# Patient Record
Sex: MALE | Race: WHITE | Employment: OTHER | URBAN - NONMETROPOLITAN AREA
[De-identification: names, ages, dates, MRNs, and addresses within clinical notes are randomized per-mention and may not be internally consistent; named-entity substitution may affect disease eponyms.]

---

## 2017-08-14 ENCOUNTER — OFFICE VISIT (OUTPATIENT)
Dept: FAMILY MEDICINE CLINIC | Age: 42
End: 2017-08-14
Payer: MEDICAID

## 2017-08-14 VITALS
SYSTOLIC BLOOD PRESSURE: 150 MMHG | HEART RATE: 93 BPM | OXYGEN SATURATION: 97 % | WEIGHT: 281 LBS | DIASTOLIC BLOOD PRESSURE: 102 MMHG | HEIGHT: 74 IN | BODY MASS INDEX: 36.06 KG/M2

## 2017-08-14 DIAGNOSIS — R53.83 FATIGUE, UNSPECIFIED TYPE: ICD-10-CM

## 2017-08-14 DIAGNOSIS — Z72.0 TOBACCO ABUSE: ICD-10-CM

## 2017-08-14 DIAGNOSIS — E78.5 HYPERLIPIDEMIA, UNSPECIFIED HYPERLIPIDEMIA TYPE: ICD-10-CM

## 2017-08-14 DIAGNOSIS — H54.40 BLINDNESS OF RIGHT EYE: ICD-10-CM

## 2017-08-14 DIAGNOSIS — I10 ESSENTIAL HYPERTENSION: ICD-10-CM

## 2017-08-14 DIAGNOSIS — E11.9 TYPE 2 DIABETES MELLITUS WITHOUT COMPLICATION, WITHOUT LONG-TERM CURRENT USE OF INSULIN (HCC): ICD-10-CM

## 2017-08-14 DIAGNOSIS — F40.01 AGORAPHOBIA WITH PANIC ATTACKS: ICD-10-CM

## 2017-08-14 DIAGNOSIS — Z00.00 ROUTINE HEALTH MAINTENANCE: Primary | ICD-10-CM

## 2017-08-14 PROCEDURE — 99205 OFFICE O/P NEW HI 60 MIN: CPT | Performed by: NURSE PRACTITIONER

## 2017-08-14 RX ORDER — DULOXETIN HYDROCHLORIDE 60 MG/1
60 CAPSULE, DELAYED RELEASE ORAL DAILY
COMMUNITY
End: 2017-08-14 | Stop reason: SDUPTHER

## 2017-08-14 RX ORDER — ATENOLOL 25 MG/1
25 TABLET ORAL DAILY
COMMUNITY
End: 2017-08-14 | Stop reason: SDUPTHER

## 2017-08-14 RX ORDER — PRAVASTATIN SODIUM 40 MG
40 TABLET ORAL DAILY
Qty: 30 TABLET | Refills: 5 | Status: SHIPPED | OUTPATIENT
Start: 2017-08-14 | End: 2017-10-12 | Stop reason: ALTCHOICE

## 2017-08-14 RX ORDER — DULOXETIN HYDROCHLORIDE 60 MG/1
60 CAPSULE, DELAYED RELEASE ORAL DAILY
Qty: 30 CAPSULE | Refills: 5 | Status: SHIPPED | OUTPATIENT
Start: 2017-08-14 | End: 2018-03-05 | Stop reason: SDUPTHER

## 2017-08-14 RX ORDER — CLONAZEPAM 1 MG/1
1 TABLET ORAL 3 TIMES DAILY PRN
COMMUNITY

## 2017-08-14 RX ORDER — ATENOLOL 25 MG/1
25 TABLET ORAL DAILY
Qty: 30 TABLET | Refills: 5 | Status: SHIPPED | OUTPATIENT
Start: 2017-08-14 | End: 2018-03-02 | Stop reason: SDUPTHER

## 2017-08-14 RX ORDER — PRAVASTATIN SODIUM 40 MG
40 TABLET ORAL DAILY
COMMUNITY
End: 2017-08-14 | Stop reason: SDUPTHER

## 2017-08-14 ASSESSMENT — ENCOUNTER SYMPTOMS
SHORTNESS OF BREATH: 0
VOMITING: 0
DIARRHEA: 0
GASTROINTESTINAL NEGATIVE: 1
ABDOMINAL PAIN: 0
CHEST TIGHTNESS: 0
ALLERGIC/IMMUNOLOGIC NEGATIVE: 1
TROUBLE SWALLOWING: 0
EYES NEGATIVE: 1
RESPIRATORY NEGATIVE: 1
SINUS PRESSURE: 0
CONSTIPATION: 0
NAUSEA: 0
COUGH: 0

## 2017-08-14 ASSESSMENT — PATIENT HEALTH QUESTIONNAIRE - PHQ9
2. FEELING DOWN, DEPRESSED OR HOPELESS: 1
1. LITTLE INTEREST OR PLEASURE IN DOING THINGS: 1
SUM OF ALL RESPONSES TO PHQ QUESTIONS 1-9: 2
SUM OF ALL RESPONSES TO PHQ9 QUESTIONS 1 & 2: 2

## 2017-09-20 ENCOUNTER — OFFICE VISIT (OUTPATIENT)
Dept: OPTOMETRY | Age: 42
End: 2017-09-20
Payer: COMMERCIAL

## 2017-09-20 DIAGNOSIS — E11.9 NON-INSULIN TREATED TYPE 2 DIABETES MELLITUS (HCC): Primary | ICD-10-CM

## 2017-09-20 DIAGNOSIS — H52.4 BILATERAL PRESBYOPIA: ICD-10-CM

## 2017-09-20 DIAGNOSIS — H53.001 AMBLYOPIA OF EYE, RIGHT: ICD-10-CM

## 2017-09-20 PROCEDURE — 92004 COMPRE OPH EXAM NEW PT 1/>: CPT | Performed by: OPTOMETRIST

## 2017-09-20 RX ORDER — PHENYLEPHRINE HCL 2.5 %
1 DROPS OPHTHALMIC (EYE) ONCE
Status: COMPLETED | OUTPATIENT
Start: 2017-09-20 | End: 2017-09-20

## 2017-09-20 RX ORDER — BENOXINATE HCL/FLUORESCEIN SOD 0.4%-0.25%
1 DROPS OPHTHALMIC (EYE) ONCE
Status: COMPLETED | OUTPATIENT
Start: 2017-09-20 | End: 2017-09-20

## 2017-09-20 RX ORDER — TROPICAMIDE 10 MG/ML
1 SOLUTION/ DROPS OPHTHALMIC ONCE
Status: COMPLETED | OUTPATIENT
Start: 2017-09-20 | End: 2017-09-20

## 2017-09-20 RX ADMIN — Medication 1 DROP: at 09:09

## 2017-09-20 RX ADMIN — TROPICAMIDE 1 DROP: 10 SOLUTION/ DROPS OPHTHALMIC at 09:09

## 2017-09-20 ASSESSMENT — REFRACTION_MANIFEST
OS_AXIS: 080
OS_SPHERE: PLANO
OS_CYLINDER: -0.75
OD_ADD: +1.00
OS_ADD: +1.00
OD_AXIS: 075
OD_CYLINDER: -0.50
OD_SPHERE: +1.75

## 2017-09-20 ASSESSMENT — VISUAL ACUITY
OD_SC: 20/30 OU
METHOD: SNELLEN - LINEAR
OS_SC+: +2
OD_SC: 20/200
OS_SC: 20/30

## 2017-09-20 ASSESSMENT — KERATOMETRY
OS_AXISANGLE_DEGREES: 089
OD_K2POWER_DIOPTERS: 42.50
OS_K2POWER_DIOPTERS: 42.50
OS_AXISANGLE2_DEGREES: 179
OD_K1POWER_DIOPTERS: 42.25
OD_AXISANGLE2_DEGREES: 044
OS_K1POWER_DIOPTERS: 42.25
OD_AXISANGLE_DEGREES: 134

## 2017-09-20 ASSESSMENT — TONOMETRY
OS_IOP_MMHG: 19
OD_IOP_MMHG: 20
IOP_METHOD: APPLANATION W FLURESS DROP

## 2017-09-20 ASSESSMENT — SLIT LAMP EXAM - LIDS
COMMENTS: NORMAL
COMMENTS: NORMAL

## 2017-10-12 DIAGNOSIS — E78.5 HYPERLIPIDEMIA, UNSPECIFIED HYPERLIPIDEMIA TYPE: Primary | ICD-10-CM

## 2017-10-12 RX ORDER — ATORVASTATIN CALCIUM 10 MG/1
10 TABLET, FILM COATED ORAL DAILY
Qty: 30 TABLET | Refills: 5 | OUTPATIENT
Start: 2017-10-12

## 2017-10-12 NOTE — TELEPHONE ENCOUNTER
Insurance denied Pravachol 40mg, but will approve Atorvastatin 10 or 20mg.  10mg pended for [facility]

## 2017-11-30 ENCOUNTER — TELEPHONE (OUTPATIENT)
Dept: FAMILY MEDICINE CLINIC | Age: 42
End: 2017-11-30

## 2018-02-12 DIAGNOSIS — F40.01 AGORAPHOBIA WITH PANIC ATTACKS: ICD-10-CM

## 2018-02-12 DIAGNOSIS — I10 ESSENTIAL HYPERTENSION: ICD-10-CM

## 2018-02-12 DIAGNOSIS — E11.9 TYPE 2 DIABETES MELLITUS WITHOUT COMPLICATION, WITHOUT LONG-TERM CURRENT USE OF INSULIN (HCC): ICD-10-CM

## 2018-02-12 RX ORDER — ATENOLOL 25 MG/1
TABLET ORAL
Qty: 30 TABLET | Refills: 5 | OUTPATIENT
Start: 2018-02-12

## 2018-02-12 RX ORDER — DULOXETIN HYDROCHLORIDE 60 MG/1
CAPSULE, DELAYED RELEASE ORAL
Qty: 30 CAPSULE | Refills: 5 | OUTPATIENT
Start: 2018-02-12

## 2018-03-01 ENCOUNTER — HOSPITAL ENCOUNTER (OUTPATIENT)
Dept: LAB | Age: 43
Setting detail: SPECIMEN
Discharge: HOME | End: 2018-03-01
Payer: MEDICAID

## 2018-03-01 ENCOUNTER — OFFICE VISIT (OUTPATIENT)
Dept: FAMILY MEDICINE CLINIC | Age: 43
End: 2018-03-01
Payer: MEDICAID

## 2018-03-01 VITALS
DIASTOLIC BLOOD PRESSURE: 82 MMHG | BODY MASS INDEX: 36.11 KG/M2 | HEART RATE: 84 BPM | SYSTOLIC BLOOD PRESSURE: 134 MMHG | RESPIRATION RATE: 12 BRPM | WEIGHT: 281.4 LBS | HEIGHT: 74 IN | OXYGEN SATURATION: 94 % | TEMPERATURE: 96.8 F

## 2018-03-01 DIAGNOSIS — Z72.0 TOBACCO ABUSE: ICD-10-CM

## 2018-03-01 DIAGNOSIS — E11.9 TYPE 2 DIABETES MELLITUS WITHOUT COMPLICATION, WITHOUT LONG-TERM CURRENT USE OF INSULIN (HCC): Primary | ICD-10-CM

## 2018-03-01 DIAGNOSIS — E11.9 TYPE 2 DIABETES MELLITUS WITHOUT COMPLICATION, WITHOUT LONG-TERM CURRENT USE OF INSULIN (HCC): ICD-10-CM

## 2018-03-01 DIAGNOSIS — F40.01 AGORAPHOBIA WITH PANIC ATTACKS: ICD-10-CM

## 2018-03-01 DIAGNOSIS — I10 ESSENTIAL HYPERTENSION: ICD-10-CM

## 2018-03-01 DIAGNOSIS — J30.9 CHRONIC ALLERGIC RHINITIS, UNSPECIFIED SEASONALITY, UNSPECIFIED TRIGGER: ICD-10-CM

## 2018-03-01 DIAGNOSIS — Z09 FOLLOW-UP EXAM, 3-6 MONTHS SINCE PREVIOUS EXAM: ICD-10-CM

## 2018-03-01 DIAGNOSIS — Z00.00 ROUTINE HEALTH MAINTENANCE: ICD-10-CM

## 2018-03-01 DIAGNOSIS — R53.83 FATIGUE, UNSPECIFIED TYPE: ICD-10-CM

## 2018-03-01 DIAGNOSIS — E78.5 HYPERLIPIDEMIA, UNSPECIFIED HYPERLIPIDEMIA TYPE: ICD-10-CM

## 2018-03-01 LAB
ABSOLUTE EOS #: 0.2 K/UL (ref 0–0.4)
ABSOLUTE IMMATURE GRANULOCYTE: NORMAL K/UL (ref 0–0.3)
ABSOLUTE LYMPH #: 2 K/UL (ref 1–4.8)
ABSOLUTE MONO #: 0.4 K/UL (ref 0.1–1.2)
ALBUMIN SERPL-MCNC: 4.4 G/DL (ref 3.5–5.2)
ALBUMIN/GLOBULIN RATIO: 1.6 (ref 1–2.5)
ALP BLD-CCNC: 83 U/L (ref 40–129)
ALT SERPL-CCNC: 53 U/L (ref 5–41)
ANION GAP SERPL CALCULATED.3IONS-SCNC: 13 MMOL/L (ref 9–17)
AST SERPL-CCNC: 29 U/L
BASOPHILS # BLD: 1 % (ref 0–2)
BASOPHILS ABSOLUTE: 0.1 K/UL (ref 0–0.2)
BILIRUB SERPL-MCNC: 0.4 MG/DL (ref 0.3–1.2)
BUN BLDV-MCNC: 10 MG/DL (ref 6–20)
BUN/CREAT BLD: 16 (ref 9–20)
CALCIUM SERPL-MCNC: 9.4 MG/DL (ref 8.6–10.4)
CHLORIDE BLD-SCNC: 100 MMOL/L (ref 98–107)
CHOLESTEROL/HDL RATIO: 7.8
CHOLESTEROL: 211 MG/DL
CO2: 25 MMOL/L (ref 20–31)
CREAT SERPL-MCNC: 0.63 MG/DL (ref 0.7–1.2)
CREATININE URINE: 203.1 MG/DL (ref 39–259)
DIFFERENTIAL TYPE: NORMAL
EOSINOPHILS RELATIVE PERCENT: 3 % (ref 1–8)
ESTIMATED AVERAGE GLUCOSE: 217 MG/DL
GFR AFRICAN AMERICAN: >60 ML/MIN
GFR NON-AFRICAN AMERICAN: >60 ML/MIN
GFR SERPL CREATININE-BSD FRML MDRD: ABNORMAL ML/MIN/{1.73_M2}
GFR SERPL CREATININE-BSD FRML MDRD: ABNORMAL ML/MIN/{1.73_M2}
GLUCOSE BLD-MCNC: 223 MG/DL (ref 70–99)
HBA1C MFR BLD: 9.2 % (ref 4.8–5.9)
HCT VFR BLD CALC: 49 % (ref 41–53)
HDLC SERPL-MCNC: 27 MG/DL
HEMOGLOBIN: 16.6 G/DL (ref 13.5–17.5)
HIV AG/AB: NONREACTIVE
IMMATURE GRANULOCYTES: NORMAL %
LDL CHOLESTEROL: 148 MG/DL (ref 0–130)
LYMPHOCYTES # BLD: 30 % (ref 15–43)
MCH RBC QN AUTO: 30.2 PG (ref 26–34)
MCHC RBC AUTO-ENTMCNC: 33.9 G/DL (ref 31–37)
MCV RBC AUTO: 88.9 FL (ref 80–100)
MICROALBUMIN/CREAT 24H UR: 85 MG/L
MICROALBUMIN/CREAT UR-RTO: 42 MCG/MG CREAT
MONOCYTES # BLD: 6 % (ref 6–14)
NRBC AUTOMATED: NORMAL PER 100 WBC
PDW BLD-RTO: 13.1 % (ref 11–14.5)
PLATELET # BLD: 233 K/UL (ref 140–450)
PLATELET ESTIMATE: NORMAL
PMV BLD AUTO: 7.9 FL (ref 6–12)
POTASSIUM SERPL-SCNC: 4.2 MMOL/L (ref 3.7–5.3)
RBC # BLD: 5.51 M/UL (ref 4.5–5.9)
RBC # BLD: NORMAL 10*6/UL
SEG NEUTROPHILS: 60 % (ref 44–74)
SEGMENTED NEUTROPHILS ABSOLUTE COUNT: 3.9 K/UL (ref 1.8–7.7)
SODIUM BLD-SCNC: 138 MMOL/L (ref 135–144)
THYROXINE, FREE: 0.87 NG/DL (ref 0.93–1.7)
TOTAL PROTEIN: 7.2 G/DL (ref 6.4–8.3)
TRIGL SERPL-MCNC: 181 MG/DL
TSH SERPL DL<=0.05 MIU/L-ACNC: 3.86 MIU/L (ref 0.3–5)
VLDLC SERPL CALC-MCNC: ABNORMAL MG/DL (ref 1–30)
WBC # BLD: 6.7 K/UL (ref 3.5–11)
WBC # BLD: NORMAL 10*3/UL

## 2018-03-01 PROCEDURE — 80061 LIPID PANEL: CPT

## 2018-03-01 PROCEDURE — 82306 VITAMIN D 25 HYDROXY: CPT

## 2018-03-01 PROCEDURE — G8427 DOCREV CUR MEDS BY ELIG CLIN: HCPCS | Performed by: NURSE PRACTITIONER

## 2018-03-01 PROCEDURE — 99214 OFFICE O/P EST MOD 30 MIN: CPT | Performed by: NURSE PRACTITIONER

## 2018-03-01 PROCEDURE — 83036 HEMOGLOBIN GLYCOSYLATED A1C: CPT

## 2018-03-01 PROCEDURE — 3046F HEMOGLOBIN A1C LEVEL >9.0%: CPT | Performed by: NURSE PRACTITIONER

## 2018-03-01 PROCEDURE — 4004F PT TOBACCO SCREEN RCVD TLK: CPT | Performed by: NURSE PRACTITIONER

## 2018-03-01 PROCEDURE — 84443 ASSAY THYROID STIM HORMONE: CPT

## 2018-03-01 PROCEDURE — 36415 COLL VENOUS BLD VENIPUNCTURE: CPT

## 2018-03-01 PROCEDURE — 87389 HIV-1 AG W/HIV-1&-2 AB AG IA: CPT

## 2018-03-01 PROCEDURE — G8417 CALC BMI ABV UP PARAM F/U: HCPCS | Performed by: NURSE PRACTITIONER

## 2018-03-01 PROCEDURE — 85025 COMPLETE CBC W/AUTO DIFF WBC: CPT

## 2018-03-01 PROCEDURE — 80053 COMPREHEN METABOLIC PANEL: CPT

## 2018-03-01 PROCEDURE — 82043 UR ALBUMIN QUANTITATIVE: CPT

## 2018-03-01 PROCEDURE — 84439 ASSAY OF FREE THYROXINE: CPT

## 2018-03-01 PROCEDURE — G8484 FLU IMMUNIZE NO ADMIN: HCPCS | Performed by: NURSE PRACTITIONER

## 2018-03-01 PROCEDURE — 82570 ASSAY OF URINE CREATININE: CPT

## 2018-03-01 RX ORDER — PRAVASTATIN SODIUM 40 MG
40 TABLET ORAL DAILY
Qty: 30 TABLET | Refills: 5 | Status: SHIPPED | OUTPATIENT
Start: 2018-03-01 | End: 2018-03-31

## 2018-03-01 RX ORDER — NICOTINE 21 MG/24HR
1 PATCH, TRANSDERMAL 24 HOURS TRANSDERMAL EVERY 24 HOURS
Qty: 30 PATCH | Refills: 3 | Status: SHIPPED | OUTPATIENT
Start: 2018-03-01 | End: 2019-03-01

## 2018-03-01 RX ORDER — FLUTICASONE PROPIONATE 50 MCG
2 SPRAY, SUSPENSION (ML) NASAL DAILY
Qty: 1 BOTTLE | Refills: 11 | Status: SHIPPED | OUTPATIENT
Start: 2018-03-01

## 2018-03-01 ASSESSMENT — ENCOUNTER SYMPTOMS
DIARRHEA: 0
SINUS PRESSURE: 0
VOMITING: 0
GASTROINTESTINAL NEGATIVE: 1
CONSTIPATION: 0
CHEST TIGHTNESS: 0
TROUBLE SWALLOWING: 0
COUGH: 0
ABDOMINAL PAIN: 0
ALLERGIC/IMMUNOLOGIC NEGATIVE: 1
SHORTNESS OF BREATH: 0
NAUSEA: 0
EYES NEGATIVE: 1

## 2018-03-01 NOTE — PROGRESS NOTES
Tobacco Use Visit Information:    Have you changed or started any medications since your last visit including any over-the-counter medicines, vitamins, or herbal medicines? no   Are you having any side effects from any of your medications? -  no  Have you stopped taking any of your medications? Is so, why? -  no    Have you seen any other physician or provider since your last visit? No  Have you had any other diagnostic tests since your last visit? No  Have you been seen in the emergency room and/or had an admission to a hospital since we last saw you? No  Have you had your routine dental cleaning in the past 6 months? no    Have you activated your [employer] account? If not, what are your barriers?  No: not interested     Patient Care Team:  Chiqui Alatorre NP as PCP - General (Family Medicine)    Current Tobacco Use    History   Smoking Status    Current Every Day Smoker    Packs/day: 1.50    Types: Cigarettes   Smokeless Tobacco    Never Used     Ready to quit: Yes  Counseling given: Yes     Are you motivated to quit? - yes  If yes, have you set a date to quite? - no    Have you tried any anti-smoking aids in the past? -  no     1-800-QUIT-NOW (1-838.298.3785)     Medical History Review  Past Medical, Family, and Social History reviewed and does contribute to the patient presenting condition    Health Maintenance   Topic Date Due    Potassium monitoring  1973    Creatinine monitoring  1973    Diabetic foot exam  03/07/1983    A1C test (Diabetic or Prediabetic)  03/07/1983    Lipid screen  03/07/1983    HIV screen  03/07/1988    Diabetic microalbuminuria test  03/07/1991    DTaP/Tdap/Td vaccine (1 - Tdap) 03/07/1992    Pneumococcal med risk (1 of 1 - PPSV23) 03/07/1992    Flu vaccine (1) 09/01/2017    Diabetic retinal exam  09/20/2018

## 2018-03-02 DIAGNOSIS — I10 ESSENTIAL HYPERTENSION: ICD-10-CM

## 2018-03-02 DIAGNOSIS — E55.9 VITAMIN D DEFICIENCY: Primary | ICD-10-CM

## 2018-03-02 LAB — VITAMIN D 25-HYDROXY: 12.5 NG/ML (ref 30–100)

## 2018-03-02 RX ORDER — ERGOCALCIFEROL 1.25 MG/1
50000 CAPSULE ORAL WEEKLY
Qty: 12 CAPSULE | Refills: 0 | Status: SHIPPED | OUTPATIENT
Start: 2018-03-02 | End: 2018-05-19

## 2018-03-05 DIAGNOSIS — I10 ESSENTIAL HYPERTENSION: ICD-10-CM

## 2018-03-05 DIAGNOSIS — F40.01 AGORAPHOBIA WITH PANIC ATTACKS: ICD-10-CM

## 2018-03-05 RX ORDER — DULOXETIN HYDROCHLORIDE 60 MG/1
60 CAPSULE, DELAYED RELEASE ORAL DAILY
Qty: 30 CAPSULE | Refills: 5 | Status: SHIPPED | OUTPATIENT
Start: 2018-03-05

## 2018-03-05 RX ORDER — ATENOLOL 25 MG/1
TABLET ORAL
Qty: 30 TABLET | Refills: 5 | Status: SHIPPED | OUTPATIENT
Start: 2018-03-05

## 2018-03-05 RX ORDER — ATENOLOL 25 MG/1
TABLET ORAL
Qty: 30 TABLET | Refills: 5 | OUTPATIENT
Start: 2018-03-05

## 2018-06-07 ENCOUNTER — TELEPHONE (OUTPATIENT)
Dept: FAMILY MEDICINE CLINIC | Age: 43
End: 2018-06-07

## 2018-06-11 ENCOUNTER — TELEPHONE (OUTPATIENT)
Dept: PRIMARY CARE CLINIC | Age: 43
End: 2018-06-11

## 2018-07-12 ENCOUNTER — TELEPHONE (OUTPATIENT)
Dept: PRIMARY CARE CLINIC | Age: 43
End: 2018-07-12

## 2018-07-12 NOTE — LETTER
[address] [facility]  [facility] [address]  Phone: [phone]  Fax: [phone]    ZOILA Collier CNP        July 12, 2018     Mark Arceo  [address] [place], [place] [id number]      Dear Dea Garcíaan: This letter is a reminder that your Vitamin D test is due. Please call our office to schedule an appointment. If you've already underwent or scheduled this procedure, please disregard this notice.     Sincerely,        ZOILA Collier CNP

## 2018-08-08 ENCOUNTER — TELEPHONE (OUTPATIENT)
Dept: FAMILY MEDICINE CLINIC | Age: 43
End: 2018-08-08

## 2018-08-20 ENCOUNTER — TELEPHONE (OUTPATIENT)
Dept: PRIMARY CARE CLINIC | Age: 43
End: 2018-08-20

## 2018-10-09 ENCOUNTER — TELEPHONE (OUTPATIENT)
Dept: FAMILY MEDICINE CLINIC | Age: 43
End: 2018-10-09

## 2019-08-26 DIAGNOSIS — E11.9 TYPE 2 DIABETES MELLITUS WITHOUT COMPLICATION, WITHOUT LONG-TERM CURRENT USE OF INSULIN (HCC): Primary | ICD-10-CM
